# Patient Record
Sex: FEMALE | URBAN - METROPOLITAN AREA
[De-identification: names, ages, dates, MRNs, and addresses within clinical notes are randomized per-mention and may not be internally consistent; named-entity substitution may affect disease eponyms.]

---

## 2024-05-22 ENCOUNTER — OFFICE VISIT (OUTPATIENT)
Age: 54
End: 2024-05-22

## 2024-05-22 VITALS
BODY MASS INDEX: 30.46 KG/M2 | HEIGHT: 71 IN | DIASTOLIC BLOOD PRESSURE: 81 MMHG | OXYGEN SATURATION: 98 % | TEMPERATURE: 98.5 F | SYSTOLIC BLOOD PRESSURE: 122 MMHG | HEART RATE: 67 BPM | WEIGHT: 217.6 LBS

## 2024-05-22 DIAGNOSIS — M54.2 NECK PAIN: Primary | ICD-10-CM

## 2024-05-22 RX ORDER — CYCLOBENZAPRINE HCL 10 MG
10 TABLET ORAL EVERY 8 HOURS PRN
Qty: 21 TABLET | Refills: 0 | Status: SHIPPED | OUTPATIENT
Start: 2024-05-22 | End: 2024-06-01

## 2024-05-22 ASSESSMENT — ENCOUNTER SYMPTOMS: PHOTOPHOBIA: 0

## 2024-05-22 NOTE — PROGRESS NOTES
Cardiovascular:  Negative for chest pain and syncope.   Musculoskeletal:  Positive for neck pain.   Neurological:  Positive for tingling, numbness and headaches. Negative for weakness.       Physical Exam  Constitutional:       Appearance: She is obese.   HENT:      Right Ear: Tympanic membrane, ear canal and external ear normal.      Left Ear: Tympanic membrane, ear canal and external ear normal.      Mouth/Throat:      Mouth: Mucous membranes are moist.      Pharynx: Posterior oropharyngeal erythema present. No pharyngeal swelling, oropharyngeal exudate or uvula swelling.      Tonsils: No tonsillar exudate or tonsillar abscesses. 0 on the right. 0 on the left.   Eyes:      Conjunctiva/sclera: Conjunctivae normal.   Cardiovascular:      Rate and Rhythm: Normal rate and regular rhythm.   Pulmonary:      Effort: Pulmonary effort is normal.      Breath sounds: Normal breath sounds.   Musculoskeletal:         General: Normal range of motion.      Cervical back: Rigidity and tenderness present. No erythema or signs of trauma. Spinous process tenderness and muscular tenderness present. Normal range of motion.   Lymphadenopathy:      Cervical:      Right cervical: No superficial cervical adenopathy.  Skin:     General: Skin is warm and dry.   Neurological:      Mental Status: She is alert and oriented to person, place, and time.      Motor: Motor function is intact. No weakness, atrophy or abnormal muscle tone.      Coordination: Coordination is intact.      Gait: Gait is intact.      Comments: Numbness/tingling is on the back of her left upper arm.    Psychiatric:         Behavior: Behavior normal.       No red flags.     An electronic signature was used to authenticate this note.    --Pam Kyle, DIONNE - CNP